# Patient Record
Sex: MALE | Race: WHITE
[De-identification: names, ages, dates, MRNs, and addresses within clinical notes are randomized per-mention and may not be internally consistent; named-entity substitution may affect disease eponyms.]

---

## 2021-11-16 ENCOUNTER — HOSPITAL ENCOUNTER (OUTPATIENT)
Dept: HOSPITAL 97 - OR | Age: 66
Discharge: HOME | End: 2021-11-16
Attending: SURGERY
Payer: COMMERCIAL

## 2021-11-16 VITALS — TEMPERATURE: 98.4 F

## 2021-11-16 VITALS — SYSTOLIC BLOOD PRESSURE: 138 MMHG | DIASTOLIC BLOOD PRESSURE: 58 MMHG

## 2021-11-16 VITALS — OXYGEN SATURATION: 98 %

## 2021-11-16 DIAGNOSIS — K21.9: Primary | ICD-10-CM

## 2021-11-16 DIAGNOSIS — Z20.822: ICD-10-CM

## 2021-11-16 DIAGNOSIS — K29.50: ICD-10-CM

## 2021-11-16 DIAGNOSIS — R10.13: ICD-10-CM

## 2021-11-16 PROCEDURE — 0DB48ZX EXCISION OF ESOPHAGOGASTRIC JUNCTION, VIA NATURAL OR ARTIFICIAL OPENING ENDOSCOPIC, DIAGNOSTIC: ICD-10-PCS

## 2021-11-16 PROCEDURE — 88312 SPECIAL STAINS GROUP 1: CPT

## 2021-11-16 PROCEDURE — 43239 EGD BIOPSY SINGLE/MULTIPLE: CPT

## 2021-11-16 PROCEDURE — 88305 TISSUE EXAM BY PATHOLOGIST: CPT

## 2021-11-16 PROCEDURE — 0DB98ZX EXCISION OF DUODENUM, VIA NATURAL OR ARTIFICIAL OPENING ENDOSCOPIC, DIAGNOSTIC: ICD-10-PCS

## 2021-11-16 PROCEDURE — 0DB78ZX EXCISION OF STOMACH, PYLORUS, VIA NATURAL OR ARTIFICIAL OPENING ENDOSCOPIC, DIAGNOSTIC: ICD-10-PCS

## 2021-11-16 PROCEDURE — 0DB68ZX EXCISION OF STOMACH, VIA NATURAL OR ARTIFICIAL OPENING ENDOSCOPIC, DIAGNOSTIC: ICD-10-PCS

## 2021-11-16 NOTE — ENDO RPT
[address], [address]





EGD PROCEDURE REPORT     EXAM DATE: 11/16/2021



PATIENT NAME:          Rickey Hutson          MR#:       [id number]



YOB: 1955     VISIT #:     [id number]

ATTENDING:     Migel Latif DR     STATUS:     outpatient

ASSISTANT:      Sujata Larios RN and Debora Ureña RN



INDICATIONS:  The patient is a 65 yr old Male here for an EGD due to GERD and

dyspepsia

PROCEDURE PERFORMED:     EGD with biopsy for H.  pylori

MEDICATIONS:     Per Anesthesia.

TOPICAL ANESTHETIC:     none



CONSENT:  The patient understands the risks and benefits of the procedure and

understands that these risks include, but are not limited to: sedation,

allergic reaction, infection, perforation and/or bleeding. Alternative means of

evaluation and treatment include, among others: physical exam, x-rays, and/or

surgical intervention. The patient elects to proceed with this endoscopic

procedure.



DESCRIPTION OF PROCEDURE:  During intra-op preparation period all mechanical 

medical equipment was checked for proper function. Hand hygiene and appropriate

measures for infection prevention was taken.  Procedure, possible

complications, and alternatives including but not limited to the possibility of

bleeding, perforation, tear, infection, sepsis, need for surgery, need for

blood transfusion, and anesthesia related complications were explained to the

patient.  After the risks, benefits and alternatives of the procedure were

thoroughly explained, Informed consent was verified, confirmed and timeout was

successfully executed by the treatment team. The patient was  placed in the

left lateral position.  The patient was anesthetized with topical anesthesia.

Through the anesthetized oropharyngeal area, the scope was passed without any

difficulty.  The EG-[id number] ([id number]) endoscope was introduced through the mouth

and advanced to the third portion of the duodenum.  Retroflexed views revealed

a small hiatal hernia.  The gastroscope was then slowly withdrawn and removed.



Moderate gastritis was found in the body and the antrum of the stomach.

Multiple biopsies were obtained and sent to pathology.  A biopsy for H.  pylori

was taken.   Bile reflux was found in the gastroesophageal junction.  A biopsy

for H.  pylori was taken.  A biopsy of the GEJ was obtained to rule out

Reyes's.   Multiple erosions were found in the body and the antrum of the

stomach.  Located 44 cm from the point of entry.  A biopsy of the GEJ was

obtained to rule out Reyes's.  A biopsy for H.  pylori was taken.







ADVERSE EVENTS:     There were no complications.

IMPRESSIONS:     1.  Moderate gastritis was found in the body and the antrum of

the stomach

2.  Bile reflux was found in the gastroesophageal junction

3.  Multiple erosions were found in the body and the antrum of the stomach





RECOMMENDATIONS:     1.  anti-reflux regimen

2.  acid suppression therapy

3.  await biopsy results

4.  follow-up: office 2 week(s)

5.  avoid NSAIDS

6.  follow-up of helicobacter pylori status, treat if indicated

REPEAT EXAM:





___________________________________

Migel Latif DR

eSigned:  Migel Latif DR 11/16/2021 10:48 AM





cc:



CPT CODES:

ICD9 CODES:





PATIENT NAME:  Rickey Hutson

MR#: [id number]

## 2025-01-30 ENCOUNTER — HOSPITAL ENCOUNTER (OUTPATIENT)
Dept: HOSPITAL 97 - DS | Age: 70
Discharge: HOME | End: 2025-01-30
Attending: UROLOGY
Payer: COMMERCIAL

## 2025-01-30 VITALS — TEMPERATURE: 97.8 F | OXYGEN SATURATION: 97 % | SYSTOLIC BLOOD PRESSURE: 144 MMHG | DIASTOLIC BLOOD PRESSURE: 77 MMHG

## 2025-01-30 VITALS — BODY MASS INDEX: 29.5 KG/M2

## 2025-01-30 DIAGNOSIS — N39.0: Primary | ICD-10-CM

## 2025-01-30 DIAGNOSIS — R97.20: ICD-10-CM

## 2025-01-30 PROCEDURE — 96365 THER/PROPH/DIAG IV INF INIT: CPT

## 2025-01-30 RX ADMIN — SODIUM CHLORIDE ONE MLS: 9 INJECTION, SOLUTION INTRAVENOUS at 06:40
